# Patient Record
(demographics unavailable — no encounter records)

---

## 2024-10-30 NOTE — HISTORY OF PRESENT ILLNESS
[FreeTextEntry1] : Patient is an 85 year-old female with history of subtotal gastrectomy for peptic ulcer disease. She had a upper endoscopy 5-6 years ago which revealed evidence of gastritis in the gastric remnant and at the BII anastomosis.  Patient  complains of post prandial abdominal bloating, mid abdominal burning, abdominal discomfort and nausea.  No more bouts of vomiting.  No relief with PPI. Better with Carafate in past. Off Metoclopramide q PM. Weight loss of about 6 lbs. 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  BM's good.  Patient complains of some discomfort in the anal area.  She feels no lump.  She has taken Preparation H with relief..   10/30/2024-Patient is a 87-year-old female who is status post subtotal gastrectomy for peptic ulcer disease many years ago.  She does have a history of chronic gastritis.  2 weeks ago, patient had some coffee-ground vomitus and melena.  She went to urgent care and was found to have pallor and was referred to the emergency room.  Hemoglobin was 5.  She was given 2 units of blood.  An upper endoscopy was not performed in the hospital.  After several days patient was discharged on sucralfate 3 times daily and omeprazole. She still has some epigastric discomfort.  Her stool is now lighter.  She still feels weak but not as weak. Patient has myelodysplasia and is being followed by hematology.  She saw hematology several months prior to her hospitalization and her hemoglobin at that time was possibly 11. Patient has been on Xarelto and aspirin which were both stopped. She had a CAT scan yesterday that revealed some thickening in the gastric remnant and anastomotic site.

## 2024-10-30 NOTE — PHYSICAL EXAM

## 2024-10-30 NOTE — ASSESSMENT
[FreeTextEntry1] : Patient with history of subtotal gastrectomy for peptic ulcer disease many years ago.  She does have a history of chronic gastritis.  She presented with melena and coffee-ground vomitus.  Her hemoglobin was 5 and she was admitted to the hospital and transfused 2 units of packed cells.  Xarelto and aspirin were stopped.  She did not have a endoscopy in the hospital.  She was discharged on sucralfate 3 times daily and omeprazole 40 mg once a day. A stat CBC was ordered.  Iron studies and B12 and folate levels were ordered.  FOBT will be sent to the lab.  She will be scheduled for an upper endoscopy as soon as possible.

## 2024-10-30 NOTE — PHYSICAL EXAM

## 2024-10-30 NOTE — REVIEW OF SYSTEMS
[Feeling Tired] : feeling tired [As Noted in HPI] : as noted in HPI [Negative] : Endocrine [de-identified] : Was on Xarelto, ASA, now off

## 2024-10-30 NOTE — REVIEW OF SYSTEMS
[Feeling Tired] : feeling tired [As Noted in HPI] : as noted in HPI [Negative] : Endocrine [de-identified] : Was on Xarelto, ASA, now off

## 2024-11-15 NOTE — PHYSICAL EXAM
[Alert] : alert [Normal Voice/Communication] : normal voice/communication [Healthy Appearing] : healthy appearing [No Acute Distress] : no acute distress [Sclera] : the sclera and conjunctiva were normal [Hearing Threshold Finger Rub Not [place]] : hearing was normal [Normal Lips/Gums] : the lips and gums were normal [Oropharynx] : the oropharynx was normal [Normal Appearance] : the appearance of the neck was normal [No Neck Mass] : no neck mass was observed [No Respiratory Distress] : no respiratory distress [No Acc Muscle Use] : no accessory muscle use [Respiration, Rhythm And Depth] : normal respiratory rhythm and effort [Auscultation Breath Sounds / Voice Sounds] : lungs were clear to auscultation bilaterally [Heart Rate And Rhythm] : heart rate was normal and rhythm regular [Normal S1, S2] : normal S1 and S2 [Murmurs] : no murmurs [Bowel Sounds] : normal bowel sounds [Abdomen Tenderness] : non-tender [No Masses] : no abdominal mass palpated [Abdomen Soft] : soft [] : no hepatosplenomegaly [Oriented To Time, Place, And Person] : oriented to person, place, and time

## 2024-11-15 NOTE — HISTORY OF PRESENT ILLNESS
[FreeTextEntry1] : Patient is an 85 year-old female with history of subtotal gastrectomy for peptic ulcer disease. She had a upper endoscopy 5-6 years ago which revealed evidence of gastritis in the gastric remnant and at the BII anastomosis.  Patient  complains of post prandial abdominal bloating, mid abdominal burning, abdominal discomfort and nausea.  No more bouts of vomiting.  No relief with PPI. Better with Carafate in past. Off Metoclopramide q PM. Weight loss of about 6 lbs. 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  BM's good.  Patient complains of some discomfort in the anal area.  She feels no lump.  She has taken Preparation H with relief..   10/30/2024-Patient is a 87-year-old female who is status post subtotal gastrectomy for peptic ulcer disease many years ago.  She does have a history of chronic gastritis.  2 weeks ago, patient had some coffee-ground vomitus and melena.  She went to urgent care and was found to have pallor and was referred to the emergency room.  Hemoglobin was 5.  She was given 2 units of blood.  An upper endoscopy was not performed in the hospital.  After several days patient was discharged on sucralfate 3 times daily and omeprazole. She still has some epigastric discomfort.  Her stool is now lighter.  She still feels weak but not as weak. Patient has myelodysplasia and is being followed by hematology.  She saw hematology several months prior to her hospitalization and her hemoglobin at that time was possibly 11. Patient has been on Xarelto and aspirin which were both stopped. She had a CAT scan yesterday that revealed some thickening in the gastric remnant and anastomotic site.  11/15/2024-patient is feeling better and stronger.  She continues to take sucralfate 3 times a day.  She takes omeprazole 20 mg once a day.  Her last blood work on 11/7 revealed BUN 26, creatinine 0.75, H/H 9.1/29.8, MCV 97.1.  Her stool color is normal.  She is due to see hematology on 12/6. Patient had an upper endoscopy on 11/6/2024.  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  The anastomosis was characterized by erosion, erythema, and friable mucosa.  Patient also had a single localized spot with the bleeding when touched.  2 clips were placed successfully and there was no further bleeding.  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  No masses were seen.  Her FOBT from 11/4 was negative.

## 2024-12-12 NOTE — PHYSICAL EXAM
[Alert] : alert [Normal Voice/Communication] : normal voice/communication [Healthy Appearing] : healthy appearing [No Acute Distress] : no acute distress [Sclera] : the sclera and conjunctiva were normal [Hearing Threshold Finger Rub Not [place]] : hearing was normal [Normal Lips/Gums] : the lips and gums were normal [Oropharynx] : the oropharynx was normal [Normal Appearance] : the appearance of the neck was normal [No Neck Mass] : no neck mass was observed [No Respiratory Distress] : no respiratory distress [No Acc Muscle Use] : no accessory muscle use [Respiration, Rhythm And Depth] : normal respiratory rhythm and effort [Auscultation Breath Sounds / Voice Sounds] : lungs were clear to auscultation bilaterally [Heart Rate And Rhythm] : heart rate was normal and rhythm regular [Normal S1, S2] : normal S1 and S2 [Murmurs] : no murmurs [None] : no edema [Bowel Sounds] : normal bowel sounds [Abdomen Tenderness] : non-tender [No Masses] : no abdominal mass palpated [Abdomen Soft] : soft [] : no hepatosplenomegaly [Abnormal Walk] : normal gait [No Clubbing, Cyanosis] : no clubbing or cyanosis of the fingernails [Normal Color / Pigmentation] : normal skin color and pigmentation [Oriented To Time, Place, And Person] : oriented to person, place, and time

## 2024-12-12 NOTE — ASSESSMENT
[FreeTextEntry1] : Gisela is a 87-year-old pleasant lady with a history of Billroth II anastomosis in the past comes with intermittent abdominal pain, nausea and dark stools.  Most recent hemoglobin was reportedly normal and FIT is negative Her symptoms are most likely related to bile induced gastritis with oozing Will monitor her hemoglobin level Will resume Carafate at least 2 times a day in the afternoon and at bedtime Continue omeprazole in the morning Follow-up in 3 to 4 weeks

## 2024-12-12 NOTE — REVIEW OF SYSTEMS
[As Noted in HPI] : as noted in HPI [Abdominal Pain] : abdominal pain [Negative] : Heme/Lymph [FreeTextEntry7] : Nausea, dark stools

## 2024-12-12 NOTE — HISTORY OF PRESENT ILLNESS
[FreeTextEntry1] : Gisela bee 87-year-old  woman was brought for follow-up, she was accompanied by her daughter She reports intermittent epigastric abdominal pain and nausea, mostly during the nights.  She also reports occasional dark stools but no rectal bleeding. Denied fever, chills, weight loss.  She has good appetite. she endorsed abdominal pain sometimes after eating the food. EGD of November 6, 2024 showed 1 bleeding spot near Billroth II anastomosis which was clipped, she reported her blood counts are normal 4 days ago at her hematology office She gets?  Procrit injections

## 2025-03-19 NOTE — ASSESSMENT
[FreeTextEntry1] : Patient with history of subtotal gastrectomy.  She does have history of chronic gastritis and recently had an anastomotic ulcer that was bleeding.  Her blood count is stable at this time.  She still complains of abdominal discomfort and bloating.  She will continue sucralfate 3 times a day and omeprazole 20 mg once a day.  She will be given Creon because of her weight loss and her abdominal bloating and her subtotal gastrectomy.  She will take the Creon with each meal.  We will give her a 2-week trial to see if this helps her symptoms. Her blood count has remained stable.  She is not a diabetic.  She was told to liberalize her diet.  She may need some dietary supplements such as Ensure if she continues to lose weight. She will be seen in follow-up in 6 months and we will monitor her chronic symptoms.  Her son will call to let us know if the medication has been working.

## 2025-03-19 NOTE — PHYSICAL EXAM

## 2025-03-19 NOTE — HISTORY OF PRESENT ILLNESS
[FreeTextEntry1] : Patient is an 85 year-old female with history of subtotal gastrectomy for peptic ulcer disease. She had a upper endoscopy 5-6 years ago which revealed evidence of gastritis in the gastric remnant and at the BII anastomosis.  Patient  complains of post prandial abdominal bloating, mid abdominal burning, abdominal discomfort and nausea.  No more bouts of vomiting.  No relief with PPI. Better with Carafate in past. Off Metoclopramide q PM. Weight loss of about 6 lbs. 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  BM's good.  Patient complains of some discomfort in the anal area.  She feels no lump.  She has taken Preparation H with relief..   10/30/2024-Patient is a 87-year-old female who is status post subtotal gastrectomy for peptic ulcer disease many years ago.  She does have a history of chronic gastritis.  2 weeks ago, patient had some coffee-ground vomitus and melena.  She went to urgent care and was found to have pallor and was referred to the emergency room.  Hemoglobin was 5.  She was given 2 units of blood.  An upper endoscopy was not performed in the hospital.  After several days patient was discharged on sucralfate 3 times daily and omeprazole. She still has some epigastric discomfort.  Her stool is now lighter.  She still feels weak but not as weak. Patient has myelodysplasia and is being followed by hematology.  She saw hematology several months prior to her hospitalization and her hemoglobin at that time was possibly 11. Patient has been on Xarelto and aspirin which were both stopped. She had a CAT scan yesterday that revealed some thickening in the gastric remnant and anastomotic site.  11/15/2024-patient is feeling better and stronger.  She continues to take sucralfate 3 times a day.  She takes omeprazole 20 mg once a day.  Her last blood work on 11/7 revealed BUN 26, creatinine 0.75, H/H 9.1/29.8, MCV 97.1.  Her stool color is normal.  She is due to see hematology on 12/6. Patient had an upper endoscopy on 11/6/2024.  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  The anastomosis was characterized by erosion, erythema, and friable mucosa.  Patient also had a single localized spot with the bleeding when touched.  2 clips were placed successfully and there was no further bleeding.  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  No masses were seen.  Her FOBT from 11/4 was negative.  3/19/2025-Patient has been losing weight.  She has lost about 10 pounds over the past 6 to 12 months.  She continues to take sucralfate for her chronic gastritis and bleeding anastomotic ulcer.  She does complain of some lower abdominal cramps and rectal urgency.  She also has some epigastric discomfort.  Her bowel movement color is normal.  Her most recent blood test revealed WBC 5.4, H/H11.8/38.4, BUNs/creatinine 28/0.76, glucose 92, total protein/albumin 7.1/4.2, alkaline phosphatase 69, bilirubin 0.4, AST/ALT 39/31, iron/TIBC 60/377, 16% iron saturation, ferritin 343. She did have a CAT scan of the abdomen and pelvis last October that revealed a normal liver and pancreas.  She did have a right renal cyst.  There were no enlarged lymph nodes.  Evidence of a subtotal gastrectomy was seen.  The rest of the exam was essentially normal.

## 2025-03-19 NOTE — REASON FOR VISIT
[Follow-up] : a follow-up of an existing diagnosis [FreeTextEntry1] : Weight loss., Decreased appetite, Chronic gastritis